# Patient Record
Sex: MALE | Race: WHITE | NOT HISPANIC OR LATINO | Employment: FULL TIME | URBAN - METROPOLITAN AREA
[De-identification: names, ages, dates, MRNs, and addresses within clinical notes are randomized per-mention and may not be internally consistent; named-entity substitution may affect disease eponyms.]

---

## 2017-01-13 ENCOUNTER — NON-PROVIDER VISIT (OUTPATIENT)
Dept: OCCUPATIONAL MEDICINE | Facility: CLINIC | Age: 22
End: 2017-01-13

## 2017-03-29 ENCOUNTER — NON-PROVIDER VISIT (OUTPATIENT)
Dept: URGENT CARE | Facility: PHYSICIAN GROUP | Age: 22
End: 2017-03-29

## 2017-03-29 DIAGNOSIS — Z02.1 PRE-EMPLOYMENT DRUG SCREENING: ICD-10-CM

## 2017-03-29 LAB
AMP AMPHETAMINE: NORMAL
COC COCAINE: NORMAL
INT CON NEG: NEGATIVE
INT CON POS: POSITIVE
MET METHAMPHETAMINES: NORMAL
OPI OPIATES: NORMAL
PCP PHENCYCLIDINE: NORMAL
POC DRUG COMMENT 753798-OCCUPATIONAL HEALTH: POSITIVE
THC: POSITIVE

## 2017-03-29 PROCEDURE — 80305 DRUG TEST PRSMV DIR OPT OBS: CPT | Performed by: NURSE PRACTITIONER

## 2017-03-29 NOTE — PATIENT INSTRUCTIONS
Daniel Jr Ahumada is a 21 y.o. male here for a non-provider visit for uds    If abnormal was an in office provider notified today (if so, indicate provider)? No  Routed to PCP? No

## 2017-04-03 ENCOUNTER — NON-PROVIDER VISIT (OUTPATIENT)
Dept: URGENT CARE | Facility: PHYSICIAN GROUP | Age: 22
End: 2017-04-03

## 2017-04-03 DIAGNOSIS — Z02.1 PRE-EMPLOYMENT DRUG SCREENING: ICD-10-CM

## 2017-04-03 LAB
AMP AMPHETAMINE: NORMAL
COC COCAINE: NORMAL
INT CON NEG: NORMAL
INT CON POS: NORMAL
MET METHAMPHETAMINES: NORMAL
OPI OPIATES: NORMAL
PCP PHENCYCLIDINE: NORMAL
POC DRUG COMMENT 753798-OCCUPATIONAL HEALTH: NEGATIVE
THC: NORMAL

## 2017-04-03 PROCEDURE — 80305 DRUG TEST PRSMV DIR OPT OBS: CPT | Performed by: NURSE PRACTITIONER

## 2017-04-03 NOTE — MR AVS SNAPSHOT
Alfredo Arrington Ahumada   4/3/2017 11:00 AM   Non-Provider Visit   MRN: [id number]    Department:  [facility]   Dept Phone:  [phone]    Description:  Male : 1995   Provider:  [facility]           Reason for Visit     Other PRE EMPLOYMENT UDS      Allergies as of 4/3/2017     Not on File      You were diagnosed with     Pre-employment drug screening   [[id number]]         Basic Information     Date Of Birth Sex Race Ethnicity Preferred Language    1995 Male  or , White Non- English      Your appointments     May 17, 2017  3:00 PM   Initial Psychiatric Eval with Teresita Shaw M.D.   BEHAVIORAL HEALTH [address] [facility] ([facility])    [address] [facility]  [address]  [facility] [address]   [phone]              Health Maintenance     Patient has no pending health maintenance at this time      Results     POCT 6 Panel Urine Drug Screen      Component    AMPHETAMINE    POC THC    COCAINE    OPIATES    PHENCYCLIDINE    METHAMPHETAMINES    POC Urine Drug Screen Comment    NEGATIVE    Internal Control Positive    Valid    Internal Control Negative    Valid                        Current Immunizations     No immunizations on file.      Below and/or attached are the medications your provider expects you to take. Review all of your home medications and newly ordered medications with your provider and/or pharmacist. Follow medication instructions as directed by your provider and/or pharmacist. Please keep your medication list with you and share with your provider. Update the information when medications are discontinued, doses are changed, or new medications (including over-the-counter products) are added; and carry medication information at all times in the event of emergency situations     Allergies:  (Not on file)          Medications  Valid as of: 2017 - 12:36 PM    Generic Name Brand Name Tablet Size Instructions for use    .                 Medicines prescribed today were  sent to:     None      Medication refill instructions:       If your prescription bottle indicates you have medication refills left, it is not necessary to call your provider’s office. Please contact your pharmacy and they will refill your medication.    If your prescription bottle indicates you do not have any refills left, you may request refills at any time through one of the following ways: The online Fablic system (except Urgent Care), by calling your provider’s office, or by asking your pharmacy to contact your provider’s office with a refill request. Medication refills are processed only during regular business hours and may not be available until the next business day. Your provider may request additional information or to have a follow-up visit with you prior to refilling your medication.   *Please Note: Medication refills are assigned a new Rx number when refilled electronically. Your pharmacy may indicate that no refills were authorized even though a new prescription for the same medication is available at the pharmacy. Please request the medicine by name with the pharmacy before contacting your provider for a refill.           Fablic Access Code: [email or web address]  Expires: 5/3/2017 12:36 PM    Your email address is not on file at [facility].  Email Addresses are required for you to sign up for Fablic, please contact [phone] to verify your personal information and to provide your email address prior to attempting to register for Fablic.    Daniel Jr Ahumada  [address] [place] [address]    Fablic  A secure, online tool to manage your health information     [facility]’s Fablic® is a secure, online tool that connects you to your personalized health information from the privacy of your home -- day or night - making it very easy for you to manage your healthcare. Once the activation process is completed, you can even access your medical information using the Fablic aditya, which is available  for free in the Apple Clint store or Google Play store.     To learn more about [employer], visit [email or web address]    There are two levels of access available (as shown below):   My Chart Features  [facility]  Urgent  Care Non-[facility] Primary [facility] Doctor   Email your healthcare team securely and privately 24/7 X X X    Manage appointments: schedule your next appointment; view details of past/upcoming appointments X      Request prescription refills. X      View recent personal medical records, including lab and immunizations X X X X   View health record, including health history, allergies, medications X X X X   Read reports about your outpatient visits, procedures, consult and ER notes X X X X   See your discharge summary, which is a recap of your hospital and/or ER visit that includes your diagnosis, lab results, and care plan X X  X     How to register for InstaJobt:  Once your e-mail address has been verified, follow the following steps to sign up for [employer].     1. Go to  [email or web address]  2. Click on the Sign Up Now box, which takes you to the New Member Sign Up page. You will need to provide the following information:  a. Enter your [employer] Access Code exactly as it appears at the top of this page. (You will not need to use this code after you’ve completed the sign-up process. If you do not sign up before the expiration date, you must request a new code.)   b. Enter your date of birth.   c. Enter your home email address.   d. Click Submit, and follow the next screen’s instructions.  3. Create a InstaJobt ID. This will be your [employer] login ID and cannot be changed, so think of one that is secure and easy to remember.  4. Create a [employer] password. You can change your password at any time.  5. Enter your Password Reset Question and Answer. This can be used at a later time if you forget your password.   6. Enter your e-mail address. This allows you to receive  e-mail notifications when new information is available in [employer].  7. Click Sign Up. You can now view your health information.    For assistance activating your [employer] account, call [phone]

## 2017-05-17 ENCOUNTER — OFFICE VISIT (OUTPATIENT)
Dept: BEHAVIORAL HEALTH | Facility: PHYSICIAN GROUP | Age: 22
End: 2017-05-17
Payer: OTHER MISCELLANEOUS

## 2017-05-17 VITALS
WEIGHT: 168 LBS | SYSTOLIC BLOOD PRESSURE: 134 MMHG | HEART RATE: 66 BPM | BODY MASS INDEX: 27 KG/M2 | DIASTOLIC BLOOD PRESSURE: 74 MMHG | HEIGHT: 66 IN

## 2017-05-17 DIAGNOSIS — F12.20 CANNABIS USE DISORDER, MODERATE, DEPENDENCE (HCC): ICD-10-CM

## 2017-05-17 DIAGNOSIS — F33.2 MDD (MAJOR DEPRESSIVE DISORDER), RECURRENT SEVERE, WITHOUT PSYCHOSIS (HCC): ICD-10-CM

## 2017-05-17 PROCEDURE — 99205 OFFICE O/P NEW HI 60 MIN: CPT | Performed by: PSYCHIATRY & NEUROLOGY

## 2017-05-17 RX ORDER — ALPRAZOLAM 0.25 MG/1
0.25 TABLET ORAL 2 TIMES DAILY PRN
COMMUNITY
Start: 2017-04-06 | End: 2017-05-17 | Stop reason: SDUPTHER

## 2017-05-17 RX ORDER — FLUOXETINE HYDROCHLORIDE 20 MG/1
20 CAPSULE ORAL DAILY
Qty: 7 CAP | Refills: 0 | Status: SHIPPED | OUTPATIENT
Start: 2017-05-17 | End: 2017-05-24 | Stop reason: SDUPTHER

## 2017-05-17 RX ORDER — ALPRAZOLAM 0.25 MG/1
0.25 TABLET ORAL
Qty: 7 TAB | Refills: 0 | Status: SHIPPED
Start: 2017-05-17 | End: 2017-05-24 | Stop reason: SDUPTHER

## 2017-05-17 RX ORDER — PAROXETINE HYDROCHLORIDE 20 MG/1
20 TABLET, FILM COATED ORAL 2 TIMES DAILY
COMMUNITY
End: 2017-05-17

## 2017-05-17 NOTE — PROGRESS NOTES
INITIAL PSYCHIATRY EVALUATION      Chief Complaint   Patient presents with   • Establish Care   • Depression   • Anxiety         History Of Present Illness:  Daniel Jr Ahumada is a 22 y.o. old male with history of depressive disorder, anxiety disorder comes in today to establish care and for evaluation of depression and anxiety. He was here with his older sister. He reports struggling with anxiety and depression since middle school and has been on Paxil for about 4 years. He quit taking Paxil 1.5-2 months ago as he felt it was no longer helping his depression. He was on 20 mg twice daily dosing and felt that it was working until about a few months ago. He started a new job at a local [employer] which he finds extremely stressful and wants to quit but cannot because of financial stressors. He states that he cannot handle stressors well and if he is in a stressful situation he either tries to run away or has suicidal thoughts. He has had on and off suicidal thoughts under stressors. Denies any current self-harm behavior. Denies current active thoughts, intent or plan of wanting to hurt himself. He feels extremely depressed and overwhelmed and hates his life. Does feel hopeless from time to time. He has good support from his parents and 3 sisters and lives with his parents in [place]. He is always been shy and has difficulty making new friends. He does not have a lot of friends and is not an currently in a relationship. He has never been in a long-term relationship and is not happy with his sexuality. He is homosexual but does not like that part about him. His father was not initially supportive but he has come around and all of this family supportive of him at this time. He has struggled a lot with his self-confidence and self-esteem and does not feel like he has any. Endorses anhedonia. Reports struggling with motivation and energy. Her but it has been good. Sleep has been variable, has problems falling asleep and denies  problems to speak. He does not have a lot of hobbies and is not interested in exploring what he might like. Sister feels that he was doing really good about a year ago when he was actively working out with her but then he stopped doing it. He was hospitalized at [facility] in December 2016 for suicidal thoughts but states that no medication changes were made when he was there. Denies any impulsive or reckless behaviors. Denies feeling paranoid or other psychotic symptoms. Denies symptoms consistent with hypomania/joanne. He does endorse anxiety over everything in his life and takes Xanax 0.25 mg which helps him calm down. Denies panic attacks.    Current psychiatric medications - Paxil 20 mg twice daily (stopped taking it 1.5-2 months ago), Xanax 0.25 mg twice daily as needed for anxiety (usually takes once daily)    Past Psychiatric History:  Prior psychiatric hospitalization - One at [facility] in 12/2016 for depression and suicidal thoughts  Self harm/suicide attempt - Denies  Previous medication trials - Zoloft (ineffective), ? Prozac     Past Medical/Surgical History:  Past Medical History   Diagnosis Date   • Depression    • Anxiety      History reviewed. No pertinent past surgical history.    Family Psychiatric History:  Sister - PTSD, depression, anxiety    Substance Use/Addiction History:  Alcohol - Drinks once a week socially with friends  Nicotine - Smokes 1 cigarette every other day  Illicit drugs - Smokes cannabis three times daily    Social History:  Born and raised in [place]. Father is originally from [place] and mom is from [place] [facility]. He has 3 older sisters and has good relationship with all of them. He is currently single, has never been  and has no kids. He graduated high school and has been working at a local [employer]) for about a month. Denies any current legal problems.    Allergies:  Review of patient's allergies indicates no known allergies.    Medications:  Current Outpatient  "Prescriptions   Medication Sig Dispense Refill   • fluoxetine (PROZAC) 20 MG Cap Take 1 Cap by mouth every day. 7 Cap 0   • alprazolam (XANAX) 0.25 MG Tab Take 1 Tab by mouth 1 time daily as needed for Anxiety. 7 Tab 0     No current facility-administered medications for this visit.       Review of Symptoms:  Constitutional - Positive for fatigue  Eyes - Negative for blurry vision  HEENT - Negative for sore throat  Respiratory - Negative for shortness of breath, cough  CVS - Negative for chest pain, palpitations  GI - Negative for nausea, vomiting, abdominal pain, diarrhea, constipation  Skin - Negative for rash  Musculoskeletal - Negative for back pain  Neurological - Negative for headaches  Psychiatric - Positive for depression, anxiety, poor sleep    Physical Examination:  Vital signs: /74 mmHg  Pulse 66  Ht 1.676 m (5' 5.98\")  Wt 76.204 kg (168 lb)  BMI 27.13 kg/m2    Musculoskeletal: Normal gait. No abnormal movements.     Mental Status Evaluation:   General: Young  male, tearful, dressed in casual attire, good grooming and hygiene, in no apparent distress, calm and cooperative, good eye contact, no psychomotor agitation or retardation  Orientation: Alert and oriented to person, place and time  Recent and remote memory: Intact  Attention span and concentration: Intact  Speech: Spontaneous, normal rate, rhythm and tone  Thought Process: Linear, logical and goal directed  Thought Content: Denies suicidal or homicidal ideations, intent or plan  Perception: Denies auditory or visual hallucinations. No delusions noted  Associations: Intact  Language: Appropriate  Fund of knowledge and vocabulary: Adequate  Mood: \"not good\"  Affect: Anxious and dysphoric, mood congruent  Insight: Good  Judgment: Good    Impression:  1. Major depressive disorder, recurrent, severe without psychotic symptoms with anxious distress  2. Cannabis use disorder, mild to moderate    Medical Records/Labs/Diagnostic Tests " Reviewed:  [place] [facility] records - appropriate refills    Plan:  1. Start Prozac 20 mg daily for anxiety and depression.  2. Continue Xanax 0.25 mg daily as needed for anxiety. #7 tabs with no refills faxed to his pharmacy.  3. Endorses passive suicidal thoughts but denies active thoughts, intent or plan to hurt himself. Denies access to guns or medications at home. Provided him with only one week worth supply of medications until his appointment next week. Discussed safety plan with patient and his sister which included calling suicide hit line number @ [phone] ([phone]), calling family, calling ER or going to the closest ER for further evaluation. Sister states that she and her other family members will be keeping close supervision over him until his next follow-up appointment.  4. Refer to Partial Hospitalization Program. He will probably need outpatient individual psychotherapy after he is done with the intensive program to work on his coping skills and self-esteem in addition to anxiety and depression.  5. Encouraged him to cut back on his cannabis use as it is contributing to his lack of motivation and desire.    Return to clinic in 1 week or sooner if symptoms worsen    The proposed treatment plan was discussed with the patient who was provided the opportunity to ask questions and make suggestions regarding alternative treatment. Patient verbalized understanding and expressed agreement with the plan.     Total face-to-face time: 60 minutes  More than 50% of face-to-face time was spent in counseling and coordinating care. Discussed depression, management, cannabis addiction, safety plan.     Teresita Shaw M.D.  05/17/2017    This note was created using voice recognition software (Dragon). The accuracy of the dictation is limited by the abilities of the software. I have reviewed the note prior to signing, however some errors in grammar and context are still possible. If you have any questions related to this  note please do not hesitate to contact our office.

## 2017-05-24 ENCOUNTER — OFFICE VISIT (OUTPATIENT)
Dept: BEHAVIORAL HEALTH | Facility: PHYSICIAN GROUP | Age: 22
End: 2017-05-24
Payer: OTHER MISCELLANEOUS

## 2017-05-24 VITALS
DIASTOLIC BLOOD PRESSURE: 70 MMHG | HEIGHT: 66 IN | BODY MASS INDEX: 26.52 KG/M2 | SYSTOLIC BLOOD PRESSURE: 120 MMHG | WEIGHT: 165 LBS | HEART RATE: 78 BPM

## 2017-05-24 DIAGNOSIS — F33.1 MDD (MAJOR DEPRESSIVE DISORDER), RECURRENT EPISODE, MODERATE (HCC): ICD-10-CM

## 2017-05-24 DIAGNOSIS — F12.20 CANNABIS USE DISORDER, MODERATE, DEPENDENCE (HCC): ICD-10-CM

## 2017-05-24 PROBLEM — F33.2 MDD (MAJOR DEPRESSIVE DISORDER), RECURRENT SEVERE, WITHOUT PSYCHOSIS (HCC): Status: RESOLVED | Noted: 2017-05-17 | Resolved: 2017-05-24

## 2017-05-24 PROCEDURE — 99213 OFFICE O/P EST LOW 20 MIN: CPT | Performed by: PSYCHIATRY & NEUROLOGY

## 2017-05-24 RX ORDER — FLUOXETINE HYDROCHLORIDE 20 MG/1
20 CAPSULE ORAL DAILY
Qty: 30 CAP | Refills: 0 | Status: SHIPPED | OUTPATIENT
Start: 2017-05-24 | End: 2017-06-23 | Stop reason: SDUPTHER

## 2017-05-24 RX ORDER — ALPRAZOLAM 0.25 MG/1
0.25 TABLET ORAL
Qty: 15 TAB | Refills: 0 | Status: SHIPPED
Start: 2017-05-24

## 2017-05-24 NOTE — PROGRESS NOTES
PSYCHIATRY FOLLOW-UP NOTE      Chief Complaint   Patient presents with   • Follow-Up     depression, anxiety         History Of Present Illness:  Daniel Jr Ahumada is a 22 y.o. old male with major depressive disorder, anxiety disorder comes in today for follow up, was last seen for an initial evaluation 1 week ago. He was here with his sister. His affect was improved from his initial appointment. He reports doing better in the last one week. He quit his job this Saturday and is feeling a lot less stressed because of that. He has been compliant with Prozac and has not noticed any side effects. Given his sister and other family members have noticed an improvement in his mood in the last one week. He is afraid that he does not handle stressful situations in a very good way and is trying to look for a job that might be less stressful for him. 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 Denies having passive or active thoughts of wanting to hurt himself in the last few days. Denies any self-harm behavior. He has been going to the gym every day since he quit his job and that has been helping his mood and anxiety as well. He is sleeping and eating better. His mom is back home from [facility] and he has good support from her as well. He does feel overwhelmed because of the financial stressors that he has because of no job but plans on working on it. He still endorses lack of motivation and lack of energy. He has also cut back on his cannabis use and is not using it several times a day. Denies any other new stressful situation at this time.    Social History:   He is currently single, has never been  and has no kids. He has good support from his parents and 3 older sisters. He lives with his parents and Richard. He graduated high school and is currently unemployed. Denies any current legal problems.    Substance Use:  Alcohol - Drinks once a week socially with friends  Nicotine -  "Smokes 1 cigarette every other day  Illicit drugs - Smokes cannabis daily, has cut down use from three times daily to once daily    Past Medication Trials:  Zoloft (ineffective), Paxil (initially effective)    Medications:  Current Outpatient Prescriptions   Medication Sig Dispense Refill   • fluoxetine (PROZAC) 20 MG Cap Take 1 Cap by mouth every day. 30 Cap 0   • alprazolam (XANAX) 0.25 MG Tab Take 1 Tab by mouth 1 time daily as needed for Anxiety. 7 Tab 0     No current facility-administered medications for this visit.       Review Of Systems:    Constitutional - Positive for fatigue  Respiratory - Negative for shortness of breath, cough  CVS - Negative for chest pain, palpitations  GI - Negative for nausea, vomiting, abdominal pain, diarrhea, constipation  Musculoskeletal - Negative for back pain  Neurological - Negative for headaches  Psychiatric - Positive for depression, anxiety    Physical Examination:  Vital signs: /70 mmHg  Pulse 78  Ht 1.676 m (5' 6\")  Wt 74.844 kg (165 lb)  BMI 26.64 kg/m2    Musculoskeletal: Normal gait. No abnormal movements.     Mental Status Evaluation:   General: Young  male, dressed in casual attire, good grooming and hygiene, in no apparent distress, calm and cooperative, good eye contact, no psychomotor agitation or retardation  Orientation: Alert and oriented to person, place and time  Recent and remote memory: Grossly intact  Attention span and concentration: Grossly intact  Speech: Spontaneous, normal rate, rhythm and tone  Thought Process: Linear, logical and goal directed  Thought Content: Denies suicidal or homicidal ideations, intent or plan  Perception: Denies auditory or visual hallucinations. No delusions noted  Associations: Intact  Language: Appropriate  Fund of knowledge and vocabulary: Grossly adequate  Mood: \"better\"  Affect: Dysphoric at times, mood congruent  Insight: Good  Judgment: Good      Impression:  1. Major depressive disorder, " recurrent, moderate with anxious distress  2. Cannabis use disorder, mild to moderate    Medical Records/Labs/Diagnostic Tests Reviewed:  [place]  records - appropriate refills     Plan:  1. Continue Prozac 20 mg daily for depression and anxiety  2. Continue Xanax 0.25 mg daily as needed for anxiety. #15 tabs with no refills faxed to [facility].  3. Reminded him about his appointment on 5/31/17 with Kanchan Pedroza R.N. for intensive outpatient program  4. Encouraged to continue to cut back on his cannabis use    Return to clinic in 4 weeks or sooner if symptoms worsen    The proposed treatment plan was discussed with the patient who was provided the opportunity to ask questions and make suggestions regarding alternative treatment. Patient verbalized understanding and expressed agreement with the plan.     Teresita Shaw M.D.  05/24/2017    This note was created using voice recognition software (Dragon). The accuracy of the dictation is limited by the abilities of the software. I have reviewed the note prior to signing, however some errors in grammar and context are still possible. If you have any questions related to this note please do not hesitate to contact our office.

## 2017-05-31 ENCOUNTER — HOSPITAL ENCOUNTER (OUTPATIENT)
Dept: BEHAVIORAL HEALTH | Facility: MEDICAL CENTER | Age: 22
End: 2017-05-31
Attending: PSYCHIATRY & NEUROLOGY
Payer: OTHER MISCELLANEOUS

## 2017-05-31 DIAGNOSIS — F33.1 MDD (MAJOR DEPRESSIVE DISORDER), RECURRENT EPISODE, MODERATE (HCC): ICD-10-CM

## 2017-05-31 DIAGNOSIS — F12.20 CANNABIS USE DISORDER, MODERATE, DEPENDENCE (HCC): ICD-10-CM

## 2017-06-07 ENCOUNTER — TELEPHONE (OUTPATIENT)
Dept: BEHAVIORAL HEALTH | Facility: MEDICAL CENTER | Age: 22
End: 2017-06-07

## 2017-06-07 NOTE — TELEPHONE ENCOUNTER
NCNS to program on 6/5/17.  This writer called Alfredo yesterday and spoke to him.  Reports he forgot to come yesterday.  Confirmed with him at the time that he would be here today on 6/7 for a 1:1 at 8am as well as for program.  NCNS for both today.  This writer called and left him a message to please call this writer related to interest in program.

## 2017-06-23 RX ORDER — FLUOXETINE HYDROCHLORIDE 20 MG/1
CAPSULE ORAL
Qty: 30 CAP | Refills: 0 | Status: SHIPPED | OUTPATIENT
Start: 2017-06-23 | End: 2017-08-10 | Stop reason: SDUPTHER

## 2017-07-17 RX ORDER — FLUOXETINE HYDROCHLORIDE 20 MG/1
CAPSULE ORAL
Qty: 30 CAP | Refills: 0 | Status: SHIPPED | OUTPATIENT
Start: 2017-07-17 | End: 2017-08-11

## 2017-08-11 RX ORDER — FLUOXETINE HYDROCHLORIDE 20 MG/1
CAPSULE ORAL
Qty: 30 CAP | Refills: 0 | Status: SHIPPED | OUTPATIENT
Start: 2017-08-11

## 2018-01-01 ENCOUNTER — APPOINTMENT (OUTPATIENT)
Dept: RADIOLOGY | Facility: MEDICAL CENTER | Age: 23
End: 2018-01-01
Attending: EMERGENCY MEDICINE

## 2018-01-01 ENCOUNTER — HOSPITAL ENCOUNTER (EMERGENCY)
Facility: MEDICAL CENTER | Age: 23
End: 2018-01-01
Attending: EMERGENCY MEDICINE

## 2018-01-01 VITALS
TEMPERATURE: 97.7 F | SYSTOLIC BLOOD PRESSURE: 129 MMHG | HEART RATE: 89 BPM | OXYGEN SATURATION: 97 % | DIASTOLIC BLOOD PRESSURE: 74 MMHG | HEIGHT: 66 IN | RESPIRATION RATE: 18 BRPM | BODY MASS INDEX: 20.89 KG/M2 | WEIGHT: 130 LBS

## 2018-01-01 DIAGNOSIS — Z13.9 ENCOUNTER FOR MEDICAL SCREENING EXAMINATION: ICD-10-CM

## 2018-01-01 DIAGNOSIS — F10.920 ALCOHOLIC INTOXICATION WITHOUT COMPLICATION (HCC): ICD-10-CM

## 2018-01-01 DIAGNOSIS — F43.21 SITUATIONAL DEPRESSION: ICD-10-CM

## 2018-01-01 LAB — GLUCOSE BLD-MCNC: 100 MG/DL (ref 65–99)

## 2018-01-01 PROCEDURE — 307740 HCHG GREEN TRAUMA TEAM SERVICES

## 2018-01-01 PROCEDURE — 99285 EMERGENCY DEPT VISIT HI MDM: CPT

## 2018-01-01 PROCEDURE — 71045 X-RAY EXAM CHEST 1 VIEW: CPT

## 2018-01-01 PROCEDURE — 82962 GLUCOSE BLOOD TEST: CPT

## 2018-01-01 ASSESSMENT — PAIN SCALES - GENERAL
PAINLEVEL_OUTOF10: 0
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
## 2018-01-01 NOTE — ED NOTES
Report from Walter SELF. Assumed care of pt. Pt resting quietly in room, A&O x4. Pt CAMERON, denies pain. Denies LOC. Pt ambulatory to BR with steady gait. FSBG 100 mg/dl.

## 2018-01-01 NOTE — ED NOTES
Discharge instructions provided, pt verbalizes understanding. Pt awake, alert, VSS. Pt ambulatory with steady gait out of ER.

## 2018-04-06 ENCOUNTER — HOSPITAL ENCOUNTER (EMERGENCY)
Facility: MEDICAL CENTER | Age: 23
End: 2018-04-06
Attending: EMERGENCY MEDICINE

## 2018-04-06 ENCOUNTER — APPOINTMENT (OUTPATIENT)
Dept: RADIOLOGY | Facility: MEDICAL CENTER | Age: 23
End: 2018-04-06
Attending: EMERGENCY MEDICINE

## 2018-04-06 VITALS
HEIGHT: 64 IN | BODY MASS INDEX: 24.62 KG/M2 | SYSTOLIC BLOOD PRESSURE: 118 MMHG | HEART RATE: 70 BPM | WEIGHT: 144.18 LBS | RESPIRATION RATE: 16 BRPM | OXYGEN SATURATION: 100 % | TEMPERATURE: 98.1 F | DIASTOLIC BLOOD PRESSURE: 76 MMHG

## 2018-04-06 DIAGNOSIS — R53.1 WEAKNESS: ICD-10-CM

## 2018-04-06 DIAGNOSIS — M54.50 ACUTE BILATERAL LOW BACK PAIN WITHOUT SCIATICA: ICD-10-CM

## 2018-04-06 LAB
ALBUMIN SERPL BCP-MCNC: 4.4 G/DL (ref 3.2–4.9)
ALBUMIN/GLOB SERPL: 1.7 G/DL
ALP SERPL-CCNC: 96 U/L (ref 30–99)
ALT SERPL-CCNC: 15 U/L (ref 2–50)
ANION GAP SERPL CALC-SCNC: 8 MMOL/L (ref 0–11.9)
APPEARANCE UR: CLEAR
AST SERPL-CCNC: 21 U/L (ref 12–45)
BASOPHILS # BLD AUTO: 0.4 % (ref 0–1.8)
BASOPHILS # BLD: 0.04 K/UL (ref 0–0.12)
BILIRUB SERPL-MCNC: 1.2 MG/DL (ref 0.1–1.5)
BUN SERPL-MCNC: 16 MG/DL (ref 8–22)
CALCIUM SERPL-MCNC: 9.1 MG/DL (ref 8.5–10.5)
CHLORIDE SERPL-SCNC: 106 MMOL/L (ref 96–112)
CO2 SERPL-SCNC: 26 MMOL/L (ref 20–33)
COLOR UR AUTO: YELLOW
CREAT SERPL-MCNC: 1.1 MG/DL (ref 0.5–1.4)
EKG IMPRESSION: NORMAL
EOSINOPHIL # BLD AUTO: 0.05 K/UL (ref 0–0.51)
EOSINOPHIL NFR BLD: 0.5 % (ref 0–6.9)
ERYTHROCYTE [DISTWIDTH] IN BLOOD BY AUTOMATED COUNT: 42.5 FL (ref 35.9–50)
EST. AVERAGE GLUCOSE BLD GHB EST-MCNC: 108 MG/DL
GLOBULIN SER CALC-MCNC: 2.6 G/DL (ref 1.9–3.5)
GLUCOSE BLD-MCNC: 88 MG/DL (ref 65–99)
GLUCOSE SERPL-MCNC: 94 MG/DL (ref 65–99)
GLUCOSE UR QL STRIP.AUTO: NEGATIVE MG/DL
HBA1C MFR BLD: 5.4 % (ref 0–5.6)
HCT VFR BLD AUTO: 43.4 % (ref 42–52)
HGB BLD-MCNC: 14.8 G/DL (ref 14–18)
IMM GRANULOCYTES # BLD AUTO: 0.04 K/UL (ref 0–0.11)
IMM GRANULOCYTES NFR BLD AUTO: 0.4 % (ref 0–0.9)
KETONES UR QL STRIP.AUTO: NEGATIVE MG/DL
LEUKOCYTE ESTERASE UR QL STRIP.AUTO: NEGATIVE
LIPASE SERPL-CCNC: 14 U/L (ref 11–82)
LYMPHOCYTES # BLD AUTO: 1.35 K/UL (ref 1–4.8)
LYMPHOCYTES NFR BLD: 12.4 % (ref 22–41)
MAGNESIUM SERPL-MCNC: 2.5 MG/DL (ref 1.5–2.5)
MCH RBC QN AUTO: 32 PG (ref 27–33)
MCHC RBC AUTO-ENTMCNC: 34.1 G/DL (ref 33.7–35.3)
MCV RBC AUTO: 93.9 FL (ref 81.4–97.8)
MONOCYTES # BLD AUTO: 1.26 K/UL (ref 0–0.85)
MONOCYTES NFR BLD AUTO: 11.6 % (ref 0–13.4)
NEUTROPHILS # BLD AUTO: 8.15 K/UL (ref 1.82–7.42)
NEUTROPHILS NFR BLD: 74.7 % (ref 44–72)
NITRITE UR QL STRIP.AUTO: NEGATIVE
NRBC # BLD AUTO: 0 K/UL
NRBC BLD-RTO: 0 /100 WBC
PH UR STRIP.AUTO: 7 [PH]
PHOSPHATE SERPL-MCNC: 2.8 MG/DL (ref 2.5–4.5)
PLATELET # BLD AUTO: 221 K/UL (ref 164–446)
PMV BLD AUTO: 11.2 FL (ref 9–12.9)
POTASSIUM SERPL-SCNC: 3.7 MMOL/L (ref 3.6–5.5)
PROT SERPL-MCNC: 7 G/DL (ref 6–8.2)
PROT UR QL STRIP: 30 MG/DL
RBC # BLD AUTO: 4.62 M/UL (ref 4.7–6.1)
RBC UR QL AUTO: ABNORMAL
SODIUM SERPL-SCNC: 140 MMOL/L (ref 135–145)
SP GR UR: <=1.005
TSH SERPL DL<=0.005 MIU/L-ACNC: 0.87 UIU/ML (ref 0.38–5.33)
WBC # BLD AUTO: 10.9 K/UL (ref 4.8–10.8)

## 2018-04-06 PROCEDURE — 74176 CT ABD & PELVIS W/O CONTRAST: CPT

## 2018-04-06 PROCEDURE — 83735 ASSAY OF MAGNESIUM: CPT

## 2018-04-06 PROCEDURE — 99284 EMERGENCY DEPT VISIT MOD MDM: CPT

## 2018-04-06 PROCEDURE — 96360 HYDRATION IV INFUSION INIT: CPT

## 2018-04-06 PROCEDURE — 80053 COMPREHEN METABOLIC PANEL: CPT

## 2018-04-06 PROCEDURE — 83690 ASSAY OF LIPASE: CPT

## 2018-04-06 PROCEDURE — 84443 ASSAY THYROID STIM HORMONE: CPT

## 2018-04-06 PROCEDURE — 93005 ELECTROCARDIOGRAM TRACING: CPT | Performed by: EMERGENCY MEDICINE

## 2018-04-06 PROCEDURE — 82962 GLUCOSE BLOOD TEST: CPT

## 2018-04-06 PROCEDURE — 93005 ELECTROCARDIOGRAM TRACING: CPT

## 2018-04-06 PROCEDURE — 85025 COMPLETE CBC W/AUTO DIFF WBC: CPT

## 2018-04-06 PROCEDURE — 81002 URINALYSIS NONAUTO W/O SCOPE: CPT

## 2018-04-06 PROCEDURE — 700105 HCHG RX REV CODE 258: Performed by: EMERGENCY MEDICINE

## 2018-04-06 PROCEDURE — 83036 HEMOGLOBIN GLYCOSYLATED A1C: CPT

## 2018-04-06 PROCEDURE — 84100 ASSAY OF PHOSPHORUS: CPT

## 2018-04-06 RX ORDER — SODIUM CHLORIDE 9 MG/ML
1000 INJECTION, SOLUTION INTRAVENOUS ONCE
Status: COMPLETED | OUTPATIENT
Start: 2018-04-06 | End: 2018-04-06

## 2018-04-06 RX ADMIN — SODIUM CHLORIDE 1000 ML: 9 INJECTION, SOLUTION INTRAVENOUS at 09:40

## 2018-04-06 ASSESSMENT — PAIN SCALES - GENERAL: PAINLEVEL_OUTOF10: 8

## 2018-04-06 NOTE — DISCHARGE INSTRUCTIONS
Back Pain, Adult  Back pain is very common in adults. The cause of back pain is rarely dangerous and the pain often gets better over time. The cause of your back pain may not be known. Some common causes of back pain include:  · Strain of the muscles or ligaments supporting the spine.  · Wear and tear (degeneration) of the spinal disks.  · Arthritis.  · Direct injury to the back.  For many people, back pain may return. Since back pain is rarely dangerous, most people can learn to manage this condition on their own.  Follow these instructions at home:  Watch your back pain for any changes. The following actions may help to lessen any discomfort you are feeling:  · Remain active. It is stressful on your back to sit or  one place for long periods of time. Do not sit, drive, or  one place for more than 30 minutes at a time. Take short walks on even surfaces as soon as you are able. Try to increase the length of time you walk each day.  · Exercise regularly as directed by your health care provider. Exercise helps your back heal faster. It also helps avoid future injury by keeping your muscles strong and flexible.  · Do not stay in bed. Resting more than 1-2 days can delay your recovery.  · Pay attention to your body when you bend and lift. The most comfortable positions are those that put less stress on your recovering back. Always use proper lifting techniques, including:  ¨ Bending your knees.  ¨ Keeping the load close to your body.  ¨ Avoiding twisting.  · Find a comfortable position to sleep. Use a firm mattress and lie on your side with your knees slightly bent. If you lie on your back, put a pillow under your knees.  · Avoid feeling anxious or stressed. Stress increases muscle tension and can worsen back pain. It is important to recognize when you are anxious or stressed and learn ways to manage it, such as with exercise.  · Take medicines only as directed by your health care provider.  Over-the-counter medicines to reduce pain and inflammation are often the most helpful. Your health care provider may prescribe muscle relaxant drugs. These medicines help dull your pain so you can more quickly return to your normal activities and healthy exercise.  · Apply ice to the injured area:  ¨ Put ice in a plastic bag.  ¨ Place a towel between your skin and the bag.  ¨ Leave the ice on for 20 minutes, 2-3 times a day for the first 2-3 days. After that, ice and heat may be alternated to reduce pain and spasms.  · Maintain a healthy weight. Excess weight puts extra stress on your back and makes it difficult to maintain good posture.  Contact a health care provider if:  · You have pain that is not relieved with rest or medicine.  · You have increasing pain going down into the legs or buttocks.  · You have pain that does not improve in one week.  · You have night pain.  · You lose weight.  · You have a fever or chills.  Get help right away if:  · You develop new bowel or bladder control problems.  · You have unusual weakness or numbness in your arms or legs.  · You develop nausea or vomiting.  · You develop abdominal pain.  · You feel faint.  This information is not intended to replace advice given to you by your health care provider. Make sure you discuss any questions you have with your health care provider.  Document Released: 12/18/2006 Document Revised: 04/27/2017 Document Reviewed: 04/21/2015  [employer] Interactive Patient Education © 2017 [employer]      Weakness  Weakness is a lack of strength. It may be felt all over the body (generalized) or in one specific part of the body (focal). Some causes of weakness can be serious. You may need further medical evaluation, especially if you are elderly or you have a history of immunosuppression (such as chemotherapy or HIV), kidney disease, heart disease, or diabetes.  CAUSES   Weakness can be caused by many different things, including:  · Infection.  · Physical  exhaustion.  · Internal bleeding or other blood loss that results in a lack of red blood cells (anemia).  · Dehydration. This cause is more common in elderly people.  · Side effects or electrolyte abnormalities from medicines, such as pain medicines or sedatives.  · Emotional distress, anxiety, or depression.  · Circulation problems, especially severe peripheral arterial disease.  · Heart disease, such as rapid atrial fibrillation, bradycardia, or heart failure.  · Nervous system disorders, such as Guillain-Barré syndrome, multiple sclerosis, or stroke.  DIAGNOSIS   To find the cause of your weakness, your caregiver will take your history and perform a physical exam. Lab tests or X-rays may also be ordered, if needed.  TREATMENT   Treatment of weakness depends on the cause of your symptoms and can vary greatly.  HOME CARE INSTRUCTIONS   · Rest as needed.  · Eat a well-balanced diet.  · Try to get some exercise every day.  · Only take over-the-counter or prescription medicines as directed by your caregiver.  SEEK MEDICAL CARE IF:   · Your weakness seems to be getting worse or spreads to other parts of your body.  · You develop new aches or pains.  SEEK IMMEDIATE MEDICAL CARE IF:   · You cannot perform your normal daily activities, such as getting dressed and feeding yourself.  · You cannot walk up and down stairs, or you feel exhausted when you do so.  · You have shortness of breath or chest pain.  · You have difficulty moving parts of your body.  · You have weakness in only one area of the body or on only one side of the body.  · You have a fever.  · You have trouble speaking or swallowing.  · You cannot control your bladder or bowel movements.  · You have black or bloody vomit or stools.  MAKE SURE YOU:  · Understand these instructions.  · Will watch your condition.  · Will get help right away if you are not doing well or get worse.  This information is not intended to replace advice given to you by your health  care provider. Make sure you discuss any questions you have with your health care provider.  Document Released: 12/18/2006 Document Revised: 06/18/2013 Document Reviewed: 10/07/2016  Elsevier Interactive Patient Education © 2017 [employer]

## 2018-04-06 NOTE — ED TRIAGE NOTES
C/o Quintanilla since last Saturday, low back pain, burning w urination,  polydipsia and polyuria, dry mouth, , had a near syncopal episode in shower, FSBS 88 at triage, EKG done at triage, given UA sample supplies, denies nvd, cp, sob.

## 2018-04-06 NOTE — ED PROVIDER NOTES
"ED Provider Note    Scribed for Pietro Rosado D.O. by Mihaela Fisher. 4/6/2018  9:27 AM    Primary care provider: Kobe No M.D.  Means of arrival: Walk-in  History obtained from: Patient  History limited by: None    CHIEF COMPLAINT  Chief Complaint   Patient presents with   • Painful Urination   • Low Back Pain   • Near Syncopal   • Headache       HPI  Daniel Jr Ahumada is a 22 y.o. male who presents to the Emergency Department for evaluation after a near syncopal event just prior to arrival. The patient was taking a shower this morning when suddenly his body collapsed and went weak. He describes his weakness as a \"tingling sensation\". Upon arrival to the ED he reports his legs \"fell asleep\". The patient reports associated lower back pain bilaterally, dehydration, chills, dysuria, nausea, and increased urine output. He denies diarrhea, abdominal pain, fever, emesis, urinary incontinence, and loss of sensation between his legs.     The patient confirms his mother has a history of diabetes. He states he uses recreational marijuana but denies any IV drug usage. Patient denies history of back pain.      REVIEW OF SYSTEMS  Pertinent positives include near syncope, weakness, lower back pain bilaterally, dehydration, chills, dysuria, nausea, increased urine output. Pertinent negatives include no diarrhea, abdominal pain, fever, emesis, urinary incontinence, loss of sensation between his legs..  All other systems reviewed and negative. C.    PAST MEDICAL HISTORY  Past Medical History:   Diagnosis Date   • Anxiety    • Depression        SURGICAL HISTORY  No past surgical history on file.     SOCIAL HISTORY  Social History   Substance Use Topics   • Smoking status: Never Smoker   • Smokeless tobacco: Never Used   • Alcohol use Yes      Comment: daily      History   Drug Use   • Types: Marijuana, Inhaled     Comment: smokes cannabis daily       FAMILY HISTORY  Family History   Problem Relation Age of Onset   • " "Hypertension Mother    • Diabetes Mother    • Anxiety disorder Mother    • Depression Sister    • Anxiety disorder Sister    • Other Sister      PTSD       CURRENT MEDICATIONS  Home Medications    **Home medications have not yet been reviewed for this encounter**         ALLERGIES  No Known Allergies    PHYSICAL EXAM  VITAL SIGNS: /72   Pulse 67   Temp 36.7 °C (98.1 °F)   Resp 16   Ht 1.626 m (5' 4\")   Wt 65.4 kg (144 lb 2.9 oz)   SpO2 100%   BMI 24.75 kg/m²     Nursing notes and vitals reviewed.  Constitutional: Well developed, Well nourished, No acute distress, Non-toxic appearance.   HENT: Dry mucous membranes.  Eyes: PERRLA, EOMI, Conjunctiva normal, No discharge.   Cardiovascular: Normal heart rate, Normal rhythm, No murmurs, No rubs, No gallops.   Thorax & Lungs: No respiratory distress, No rales, No rhonchi, No wheezing, No chest tenderness.   Abdomen: Bowel sounds normal, Soft, No tenderness, No guarding, No rebound, No masses, No pulsatile masses.   Skin: Warm, Dry, No erythema, No rash.   Musculoskeletal: Intact distal pulses, No edema, No cyanosis, No clubbing. Good range of motion in all major joints. No tenderness to palpation or major deformities noted, no CVA tenderness, no midline back tenderness. No saddle anesthesia. Perfect vertebral lumbar. No paravertebral lumbar muscle spasm and tenderness. No step-off deformity.  Neurologic: Alert & oriented to month and age, Normal cognition, Cranial nerves II-XII are intact, No slurred speech, Negative finger to nose bilaterally, No pronator drift bilaterally,   strength 5/5 bilaterally, Leg raise strength 5/5 bilaterally, Plantarflexion strength 5/5 bilaterally, Dorsiflexion strength 5/5 bilaterally, Deep tendon reflexes 2/4 upper and lower extremities bilaterally, Sensation intact throughout, No Nystagmus. No saddle anesthesia.  Psychiatric: Affect normal for clinical presentation.      DIAGNOSTIC STUDIES/PROCEDURES    LABS  Results for " orders placed or performed during the hospital encounter of 04/06/18   CBC WITH DIFFERENTIAL   Result Value Ref Range    WBC 10.9 (H) 4.8 - 10.8 K/uL    RBC 4.62 (L) 4.70 - 6.10 M/uL    Hemoglobin 14.8 14.0 - 18.0 g/dL    Hematocrit 43.4 42.0 - 52.0 %    MCV 93.9 81.4 - 97.8 fL    MCH 32.0 27.0 - 33.0 pg    MCHC 34.1 33.7 - 35.3 g/dL    RDW 42.5 35.9 - 50.0 fL    Platelet Count 221 164 - 446 K/uL    MPV 11.2 9.0 - 12.9 fL    Neutrophils-Polys 74.70 (H) 44.00 - 72.00 %    Lymphocytes 12.40 (L) 22.00 - 41.00 %    Monocytes 11.60 0.00 - 13.40 %    Eosinophils 0.50 0.00 - 6.90 %    Basophils 0.40 0.00 - 1.80 %    Immature Granulocytes 0.40 0.00 - 0.90 %    Nucleated RBC 0.00 /100 WBC    Neutrophils (Absolute) 8.15 (H) 1.82 - 7.42 K/uL    Lymphs (Absolute) 1.35 1.00 - 4.80 K/uL    Monos (Absolute) 1.26 (H) 0.00 - 0.85 K/uL    Eos (Absolute) 0.05 0.00 - 0.51 K/uL    Baso (Absolute) 0.04 0.00 - 0.12 K/uL    Immature Granulocytes (abs) 0.04 0.00 - 0.11 K/uL    NRBC (Absolute) 0.00 K/uL   COMP METABOLIC PANEL   Result Value Ref Range    Sodium 140 135 - 145 mmol/L    Potassium 3.7 3.6 - 5.5 mmol/L    Chloride 106 96 - 112 mmol/L    Co2 26 20 - 33 mmol/L    Anion Gap 8.0 0.0 - 11.9    Glucose 94 65 - 99 mg/dL    Bun 16 8 - 22 mg/dL    Creatinine 1.10 0.50 - 1.40 mg/dL    Calcium 9.1 8.5 - 10.5 mg/dL    AST(SGOT) 21 12 - 45 U/L    ALT(SGPT) 15 2 - 50 U/L    Alkaline Phosphatase 96 30 - 99 U/L    Total Bilirubin 1.2 0.1 - 1.5 mg/dL    Albumin 4.4 3.2 - 4.9 g/dL    Total Protein 7.0 6.0 - 8.2 g/dL    Globulin 2.6 1.9 - 3.5 g/dL    A-G Ratio 1.7 g/dL   LIPASE   Result Value Ref Range    Lipase 14 11 - 82 U/L   HEMOGLOBIN A1C   Result Value Ref Range    Glycohemoglobin 5.4 0.0 - 5.6 %    Est Avg Glucose 108 mg/dL   TSH   Result Value Ref Range    TSH 0.870 0.380 - 5.330 uIU/mL   MAGNESIUM   Result Value Ref Range    Magnesium 2.5 1.5 - 2.5 mg/dL   PHOSPHORUS   Result Value Ref Range    Phosphorus 2.8 2.5 - 4.5 mg/dL   ESTIMATED  GFR   Result Value Ref Range    GFR If African American >60 >60 mL/min/1.73 m 2    GFR If Non African American >60 >60 mL/min/1.73 m 2   ACCU-CHEK GLUCOSE   Result Value Ref Range    Glucose - Accu-Ck 88 65 - 99 mg/dL   POC UA   Result Value Ref Range    POC Color Yellow     POC Appearance Clear     POC Glucose Negative Negative mg/dL    POC Ketones Negative Negative mg/dL    POC Specific Gravity <=1.005 (A) 1.005 - 1.030    POC Blood Moderate (A) Negative    POC Urine PH 7.0 5.0 - 8.0    POC Protein 30 (A) Negative mg/dL    POC Nitrites Negative Negative    POC Leukocyte Esterase Negative Negative   EKG (ER)   Result Value Ref Range    Report       [facility] Emergency Dept.    Test Date:  2018  Pt Name:    DANIEL AHUMADA               Department: ER  MRN:        [id number]                      Room:  Gender:     Male                         Technician: [id number]  :        1995                   Requested By:ER TRIAGE PROTOCOL  Order #:    [id number]                    Reading MD:    Measurements  Intervals                                Axis  Rate:       55                           P:          72  DC:         160                          QRS:        49  QRSD:       84                           T:          46  QT:         412  QTc:        394    Interpretive Statements  SINUS BRADYCARDIA  No previous ECG available for comparison         All labs reviewed by me.      RADIOLOGY  CT-RENAL COLIC EVALUATION(A/P W/O)   Final Result         1.  Negative noncontrast CT scan of the abdomen and pelvis.      2.  No hydronephrosis or nephrolithiasis.      3.  Normal appearance of the appendix in the right lower quadrant.        The radiologist's interpretation of all radiological studies have been reviewed by me.    COURSE & MEDICAL DECISION MAKING  Pertinent Labs & Imaging studies reviewed. (See chart for details)    9:27 AM - Patient seen and examined at bedside. Patient will be treated with NS  "Infusion 1,000 mL secondary to his dry mucous membranes. Ordered Hemoglobin A1C, TSH, Magnesium, Phosphorus, Estimated GFR, CBC with Differential, CMP, Lipase, POC urinalysis, POC Ua, Accu-Chek Glucose, EKG to evaluate his symptoms.     12:35 PM Recheck: Patient is resting comfortably and reports feeling improved. She does not have any other complaints at this time. I updated her on her results, which indicated no acute abnormalities. I explained that she is now stable for discharge. I advised her to follow up with her primary care provider and to return to the ED for new or worsening symptoms. She understands and will comply.   /72   Pulse 67   Temp 36.7 °C (98.1 °F)   Resp 16   Ht 1.626 m (5' 4\")   Wt 65.4 kg (144 lb 2.9 oz)   SpO2 100%   BMI 24.75 kg/m²       This is a charming 22 y.o. male that presents with vague complaints of thirst, increased urination, near syncope. Here in the emergency department, CBC is negative and I believe he has a significant infection is afebrile, white lights are negative does not have evidence of diabetes, a significant lites abnormality. TSH is normal as well. Reevaluation after receiving IV fluids, patient is asymptomatic. I'm not sure the etiology of patient's condition but he does not have evidence of CVA, TIA A, myocardial infarction, diabetes, severe electrolyte abnormality. 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 He has no evidence of cauda equina syndrome, no red flags for epidural abscess, epidural hematoma, he is ambulatory is no focal neurological deficits.    The patient will return for new or worsening symptoms and is stable at the time of discharge.      DISPOSITION:  Patient will be discharged home in stable condition.    FOLLOW UP:  [facility], Emergency Dept  [address] [address] [phone]    If symptoms " sandrita No M.D.  [address]  [address]  [place] [address]  [phone]    Schedule an appointment as soon as possible for a visit in 3 days        OUTPATIENT MEDICATIONS:  Discharge Medication List as of 4/6/2018 12:42 PM          FINAL IMPRESSION  1. Weakness    2. Acute bilateral low back pain without sciatica          I, Mihaela Fisher (Scribe), am scribing for, and in the presence of, Pietro Rosado D.O    Electronically signed by: Mihaela Fisher (Scribe), 4/6/2018    IPietro D.O. personally performed the services described in this documentation, as scribed by Mihaela Fisher in my presence, and it is both accurate and complete.    The note accurately reflects work and decisions made by me.  Pietro Rosado  4/6/2018  3:53 PM

## 2018-04-08 ENCOUNTER — HOSPITAL ENCOUNTER (EMERGENCY)
Facility: MEDICAL CENTER | Age: 23
End: 2018-04-08
Attending: EMERGENCY MEDICINE

## 2018-04-08 VITALS
BODY MASS INDEX: 24.94 KG/M2 | TEMPERATURE: 97.9 F | HEART RATE: 51 BPM | HEIGHT: 65 IN | SYSTOLIC BLOOD PRESSURE: 129 MMHG | WEIGHT: 149.69 LBS | OXYGEN SATURATION: 96 % | RESPIRATION RATE: 18 BRPM | DIASTOLIC BLOOD PRESSURE: 68 MMHG

## 2018-04-08 DIAGNOSIS — R19.7 NAUSEA VOMITING AND DIARRHEA: ICD-10-CM

## 2018-04-08 DIAGNOSIS — R11.2 NAUSEA VOMITING AND DIARRHEA: ICD-10-CM

## 2018-04-08 DIAGNOSIS — S33.5XXD LOW BACK SPRAIN, SUBSEQUENT ENCOUNTER: ICD-10-CM

## 2018-04-08 LAB
ALBUMIN SERPL BCP-MCNC: 4.2 G/DL (ref 3.2–4.9)
ALBUMIN/GLOB SERPL: 1.7 G/DL
ALP SERPL-CCNC: 85 U/L (ref 30–99)
ALT SERPL-CCNC: 13 U/L (ref 2–50)
ANION GAP SERPL CALC-SCNC: 11 MMOL/L (ref 0–11.9)
APPEARANCE UR: CLEAR
AST SERPL-CCNC: 17 U/L (ref 12–45)
BASOPHILS # BLD AUTO: 0.3 % (ref 0–1.8)
BASOPHILS # BLD: 0.04 K/UL (ref 0–0.12)
BILIRUB SERPL-MCNC: 0.5 MG/DL (ref 0.1–1.5)
BILIRUB UR QL STRIP.AUTO: NEGATIVE
BUN SERPL-MCNC: 13 MG/DL (ref 8–22)
CALCIUM SERPL-MCNC: 8.8 MG/DL (ref 8.5–10.5)
CHLORIDE SERPL-SCNC: 109 MMOL/L (ref 96–112)
CO2 SERPL-SCNC: 25 MMOL/L (ref 20–33)
COLOR UR: YELLOW
CREAT SERPL-MCNC: 1.19 MG/DL (ref 0.5–1.4)
CULTURE IF INDICATED INDCX: NO UA CULTURE
EOSINOPHIL # BLD AUTO: 0.05 K/UL (ref 0–0.51)
EOSINOPHIL NFR BLD: 0.4 % (ref 0–6.9)
ERYTHROCYTE [DISTWIDTH] IN BLOOD BY AUTOMATED COUNT: 42.5 FL (ref 35.9–50)
GLOBULIN SER CALC-MCNC: 2.5 G/DL (ref 1.9–3.5)
GLUCOSE SERPL-MCNC: 114 MG/DL (ref 65–99)
GLUCOSE UR STRIP.AUTO-MCNC: NEGATIVE MG/DL
HCT VFR BLD AUTO: 41.3 % (ref 42–52)
HGB BLD-MCNC: 14.2 G/DL (ref 14–18)
IMM GRANULOCYTES # BLD AUTO: 0.05 K/UL (ref 0–0.11)
IMM GRANULOCYTES NFR BLD AUTO: 0.4 % (ref 0–0.9)
KETONES UR STRIP.AUTO-MCNC: NEGATIVE MG/DL
LEUKOCYTE ESTERASE UR QL STRIP.AUTO: NEGATIVE
LYMPHOCYTES # BLD AUTO: 1.41 K/UL (ref 1–4.8)
LYMPHOCYTES NFR BLD: 12.3 % (ref 22–41)
MCH RBC QN AUTO: 32.3 PG (ref 27–33)
MCHC RBC AUTO-ENTMCNC: 34.4 G/DL (ref 33.7–35.3)
MCV RBC AUTO: 94.1 FL (ref 81.4–97.8)
MICRO URNS: NORMAL
MONOCYTES # BLD AUTO: 0.91 K/UL (ref 0–0.85)
MONOCYTES NFR BLD AUTO: 8 % (ref 0–13.4)
NEUTROPHILS # BLD AUTO: 8.98 K/UL (ref 1.82–7.42)
NEUTROPHILS NFR BLD: 78.6 % (ref 44–72)
NITRITE UR QL STRIP.AUTO: NEGATIVE
NRBC # BLD AUTO: 0 K/UL
NRBC BLD-RTO: 0 /100 WBC
PH UR STRIP.AUTO: 7.5 [PH]
PLATELET # BLD AUTO: 212 K/UL (ref 164–446)
PMV BLD AUTO: 11.6 FL (ref 9–12.9)
POTASSIUM SERPL-SCNC: 3.8 MMOL/L (ref 3.6–5.5)
PROT SERPL-MCNC: 6.7 G/DL (ref 6–8.2)
PROT UR QL STRIP: NEGATIVE MG/DL
RBC # BLD AUTO: 4.39 M/UL (ref 4.7–6.1)
RBC UR QL AUTO: NEGATIVE
SODIUM SERPL-SCNC: 145 MMOL/L (ref 135–145)
SP GR UR STRIP.AUTO: 1
UROBILINOGEN UR STRIP.AUTO-MCNC: 0.2 MG/DL
WBC # BLD AUTO: 11.4 K/UL (ref 4.8–10.8)

## 2018-04-08 PROCEDURE — 36415 COLL VENOUS BLD VENIPUNCTURE: CPT

## 2018-04-08 PROCEDURE — 700105 HCHG RX REV CODE 258: Performed by: EMERGENCY MEDICINE

## 2018-04-08 PROCEDURE — 700111 HCHG RX REV CODE 636 W/ 250 OVERRIDE (IP): Performed by: EMERGENCY MEDICINE

## 2018-04-08 PROCEDURE — 96375 TX/PRO/DX INJ NEW DRUG ADDON: CPT

## 2018-04-08 PROCEDURE — 80053 COMPREHEN METABOLIC PANEL: CPT

## 2018-04-08 PROCEDURE — 99284 EMERGENCY DEPT VISIT MOD MDM: CPT

## 2018-04-08 PROCEDURE — 96374 THER/PROPH/DIAG INJ IV PUSH: CPT

## 2018-04-08 PROCEDURE — 85025 COMPLETE CBC W/AUTO DIFF WBC: CPT

## 2018-04-08 PROCEDURE — 96361 HYDRATE IV INFUSION ADD-ON: CPT

## 2018-04-08 PROCEDURE — 81003 URINALYSIS AUTO W/O SCOPE: CPT

## 2018-04-08 RX ORDER — SODIUM CHLORIDE 9 MG/ML
1000 INJECTION, SOLUTION INTRAVENOUS ONCE
Status: COMPLETED | OUTPATIENT
Start: 2018-04-08 | End: 2018-04-08

## 2018-04-08 RX ORDER — METOCLOPRAMIDE HYDROCHLORIDE 5 MG/ML
10 INJECTION INTRAMUSCULAR; INTRAVENOUS ONCE
Status: COMPLETED | OUTPATIENT
Start: 2018-04-08 | End: 2018-04-08

## 2018-04-08 RX ORDER — ONDANSETRON 2 MG/ML
8 INJECTION INTRAMUSCULAR; INTRAVENOUS ONCE
Status: COMPLETED | OUTPATIENT
Start: 2018-04-08 | End: 2018-04-08

## 2018-04-08 RX ORDER — ONDANSETRON 4 MG/1
4 TABLET, ORALLY DISINTEGRATING ORAL EVERY 6 HOURS PRN
Qty: 10 TAB | Refills: 0 | Status: SHIPPED | OUTPATIENT
Start: 2018-04-08

## 2018-04-08 RX ADMIN — METOCLOPRAMIDE 10 MG: 5 INJECTION, SOLUTION INTRAMUSCULAR; INTRAVENOUS at 06:09

## 2018-04-08 RX ADMIN — ONDANSETRON 8 MG: 2 INJECTION, SOLUTION INTRAMUSCULAR; INTRAVENOUS at 04:49

## 2018-04-08 RX ADMIN — SODIUM CHLORIDE 1000 ML: 9 INJECTION, SOLUTION INTRAVENOUS at 04:50

## 2018-04-08 ASSESSMENT — LIFESTYLE VARIABLES: DO YOU DRINK ALCOHOL: NO

## 2018-04-08 NOTE — ED NOTES
Pt discharged to home, prescriptions for Zofran in hand.  Pt is a/ox4, amb with a steady gait, all belongings accounted for.  Pt educated to return to ed if symptoms worsen

## 2018-04-08 NOTE — ED NOTES
Pt medicated for nausea, NS infusing.  Blood drawn off IV start and sent to lab.  Pt to provide urine sample when able. Pt resting comfortably on gurney, family at bedside

## 2018-04-08 NOTE — DISCHARGE INSTRUCTIONS
Viral Gastroenteritis, Adult  Viral gastroenteritis is also known as the stomach flu. This condition is caused by various viruses. These viruses can be passed from person to person very easily (are very contagious). This condition may affect your stomach, small intestine, and large intestine. It can cause sudden watery diarrhea, fever, and vomiting.  Diarrhea and vomiting can make you feel weak and cause you to become dehydrated. You may not be able to keep fluids down. Dehydration can make you tired and thirsty, cause you to have a dry mouth, and decrease how often you urinate. Older adults and people with other diseases or a weak immune system are at higher risk for dehydration.  It is important to replace the fluids that you lose from diarrhea and vomiting. If you become severely dehydrated, you may need to get fluids through an IV tube.  What are the causes?  Gastroenteritis is caused by various viruses, including rotavirus and norovirus. Norovirus is the most common cause in adults.  You can get sick by eating food, drinking water, or touching a surface contaminated with one of these viruses. You can also get sick from sharing utensils or other personal items with an infected person.  What increases the risk?  This condition is more likely to develop in people:  · Who have a weak defense system (immune system).  · Who live with one or more children who are younger than 2 years old.  · Who live in a nursing home.  · Who go on cruise ships.  What are the signs or symptoms?  Symptoms of this condition start suddenly 1-2 days after exposure to a virus. Symptoms may last a few days or as long as a week. The most common symptoms are watery diarrhea and vomiting. Other symptoms include:  · Fever.  · Headache.  · Fatigue.  · Pain in the abdomen.  · Chills.  · Weakness.  · Nausea.  · Muscle aches.  · Loss of appetite.  How is this diagnosed?  This condition is diagnosed with a medical history and physical exam. You may  also have a stool test to check for viruses or other infections.  How is this treated?  This condition typically goes away on its own. The focus of treatment is to restore lost fluids (rehydration). Your health care provider may recommend that you take an oral rehydration solution (ORS) to replace important salts and minerals (electrolytes) in your body. Severe cases of this condition may require giving fluids through an IV tube.  Treatment may also include medicine to help with your symptoms.  Follow these instructions at home:  Follow instructions from your health care provider about how to care for yourself at home.  Eating and drinking  Follow these recommendations as told by your health care provider:  · Take an ORS. This is a drink that is sold at pharmacies and retail stores.  · Drink clear fluids in small amounts as you are able. Clear fluids include water, ice chips, diluted fruit juice, and low-calorie sports drinks.  · Eat bland, easy-to-digest foods in small amounts as you are able. These foods include bananas, applesauce, rice, lean meats, toast, and crackers.  · Avoid fluids that contain a lot of sugar or caffeine, such as energy drinks, sports drinks, and soda.  · Avoid alcohol.  · Avoid spicy or fatty foods.  General instructions  · Drink enough fluid to keep your urine clear or pale yellow.  · Wash your hands often. If soap and water are not available, use hand .  · Make sure that all people in your household wash their hands well and often.  · Take over-the-counter and prescription medicines only as told by your health care provider.  · Rest at home while you recover.  · Watch your condition for any changes.  · Take a warm bath to relieve any burning or pain from frequent diarrhea episodes.  · Keep all follow-up visits as told by your health care provider. This is important.  Contact a health care provider if:  · You cannot keep fluids down.  · Your symptoms get worse.  · You have new  symptoms.  · You feel light-headed or dizzy.  · You have muscle cramps.  Get help right away if:  · You have chest pain.  · You feel extremely weak or you faint.  · You see blood in your vomit.  · Your vomit looks like coffee grounds.  · You have bloody or black stools or stools that look like tar.  · You have a severe headache, a stiff neck, or both.  · You have a rash.  · You have severe pain, cramping, or bloating in your abdomen.  · You have trouble breathing or you are breathing very quickly.  · Your heart is beating very quickly.  · Your skin feels cold and clammy.  · You feel confused.  · You have pain when you urinate.  · You have signs of dehydration, such as:  ¨ Dark urine, very little urine, or no urine.  ¨ Cracked lips.  ¨ Dry mouth.  ¨ Sunken eyes.  ¨ Sleepiness.  ¨ Weakness.  This information is not intended to replace advice given to you by your health care provider. Make sure you discuss any questions you have with your health care provider.  Document Released: 12/18/2006 Document Revised: 05/31/2017 Document Reviewed: 08/23/2016  [employer] Patient Education © 2017 [employer]

## 2018-04-08 NOTE — ED TRIAGE NOTES
.Daniel Jr Ahumada  .22 y.o.  .  Chief Complaint   Patient presents with   • Low Back Pain     X1 week; seen for same on 4/6 and dc'd w/ diagnosis of back pain; rated at a 7/10; described as a nonradiating dull achy pain; worsens w/ ambulation   • Dehydration     pt states he has a dry mouth and is dehydrated from vomiting and diarrhea.      Pt ambulatory to triage w/ several complaints including what's listed above; pt seen for same on 4/6 and dc'd w/ diagnosis of back pain w/out sciatica. NAD observed in triage.   Pt placed in lobby and advised to notify staff of new or worsening symptoms.

## 2018-04-08 NOTE — ED PROVIDER NOTES
ED Provider Note    Scribed for Brittani Dan M.D. by Yarely Hobbs. 4/8/2018, 4:32 AM.    Primary care provider: Kobe No M.D.  Means of arrival: Walk in  History obtained from: Patient  History limited by: None    CHIEF COMPLAINT  Chief Complaint   Patient presents with   • Low Back Pain     X1 week; seen for same on 4/6 and dc'd w/ diagnosis of back pain; rated at a 7/10; described as a nonradiating dull achy pain; worsens w/ ambulation   • Dehydration     pt states he has a dry mouth and is dehydrated from vomiting and diarrhea.        HPI  Daniel Jr Ahumada is a 22 y.o. male who presents to the Emergency Department for bilateral lower back pain onset 1 week ago. He states the pain is a 7/10. It is exacerbate by walking. The patient additionally endorses chills, vomiting, and diarrhea. He also reports dehydration with associated dry mouth. The patient was seen in the ED 4/6 for the same complaint and diagnosed with back pain. He has been taking Ibuprofen 600 mg with mild relief of his pain. Patient denies hematochezia, hematuria, fevers, or weakness in legs. He reports no recent travel.     REVIEW OF SYSTEMS  Positives as above. Pertinent negatives include  hematochezia, hematuria, fevers, or weakness in legs   All other review of systems are negative.  C.    PAST MEDICAL HISTORY   has a past medical history of Anxiety and Depression.    SURGICAL HISTORY  patient denies any surgical history    SOCIAL HISTORY  Social History   Substance Use Topics   • Smoking status: Never Smoker   • Smokeless tobacco: Never Used   • Alcohol use Yes      Comment: daily      History   Drug Use   • Types: Marijuana, Inhaled     Comment: smokes cannabis daily       FAMILY HISTORY  Family History   Problem Relation Age of Onset   • Hypertension Mother    • Diabetes Mother    • Anxiety disorder Mother    • Depression Sister    • Anxiety disorder Sister    • Other Sister      PTSD       CURRENT MEDICATIONS  Reviewed. See  "Encounter Summary.     ALLERGIES  No Known Allergies    PHYSICAL EXAM  VITAL SIGNS: /68   Pulse 66   Temp 36.6 °C (97.9 °F) (Temporal)   Resp 18   Ht 1.651 m (5' 5\")   Wt 67.9 kg (149 lb 11.1 oz)   SpO2 98%   BMI 24.91 kg/m²    Pulse ox interpretation: I interpret this pulse ox as normal.  Constitutional: Alert in no apparent distress.  HENT: Normocephalic atraumatic, dry mucous membranes.   Eyes: PERR, Conjunctiva normal, Non-icteric.   Neck: Normal range of motion, No tenderness, Supple, No stridor.   Lymphatic: No lymphadenopathy noted.   Cardiovascular: Regular rate and rhythm, no murmurs.   Thorax & Lungs: Normal breath sounds, No respiratory distress, No wheezing, No chest tenderness.   Abdomen: Hyperactive bowel sounds, Soft, No tenderness, No pulsatile masses. No peritoneal signs.  Skin: Warm, Dry, No erythema, No rash.   Back: No bony tenderness, No CVA tenderness.   Extremities: Intact distal pulses, No edema, No tenderness, No cyanosis  Musculoskeletal: Bilateral paraspinal musculature sacroiliac tenderness. No midline tenderness, step offs, swelling, or redness.  Neurologic: Alert and oriented, No focal deficits noted. Strength 5/5 bilateral upper and lower extremities, ambulated without difficulty . Sensation in tact to light touch distally. No clonus.    DIFFERENTIAL DIAGNOSIS AND WORK UP PLAN    4:32 AM Patient seen and examined at bedside. The patient presents with bilateral lower back pain and the differential diagnosis includes but is not limited to viral gastroenteritis, electrolyte abnormality, UTI, dehydration, musculature sprain or strain. Low concern for central spinous process, epidural abscess, or discitis. Ordered for Urinalysis, CMP, and CBC to evaluate. Patient will be treated with Zofran 8 mg for his symptoms. He will additionally receive 1 L NS for dry mucous membranes.    DIAGNOSTIC STUDIES / PROCEDURES     LABS  CBC with a mildly elevated white blood cell count 11 mildly " "increased from 10 few days ago with a left shift, CMP within normal limits urinalysis normal  All labs were reviewed by me.    COURSE & MEDICAL DECISION MAKING  Pertinent Labs & Imaging studies reviewed. (See chart for details)    5:56 AM - Reviewed patient's lab results as shown above.    5:57 AM - Patient reevaluated at bedside. He reports to be experiencing some mild nausea, but is able to tolerate Po's. The patient has had positive reaction to IV fluids.    6:17 AM - Patient reevaluated at bedside. He was informed of lab results as shown above. Patient will be discharged. He is agreeable.     This is a 22 y.o. year old male who presents with likely to diagnoses a viral gastroenteritis with nausea vomiting diarrhea, feeling better after 2 doses of antiemetics and tolerating ice chips. He'll be sent home with ODT Zofran and strict return precautions for any worsening vomiting that is not controlled at home. Likely has a lower back muscular strain or sprain we discussed range of motion exercises icing and stretching. He continue to take ibuprofen. He'll follow up with his primary care provider    /74   Pulse (!) 51   Temp 36.6 °C (97.9 °F) (Temporal)   Resp 18   Ht 1.651 m (5' 5\")   Wt 67.9 kg (149 lb 11.1 oz)   SpO2 96%   BMI 24.91 kg/m²       The patient will return for new or worsening symptoms and is stable at the time of discharge.    DISPOSITION:  Patient will be discharged home in stable condition.    FOLLOW UP:  Kobe No M.D.  [address]  [address]  [place] [facility] [address]  [phone]    Schedule an appointment as soon as possible for a visit      [facility], Emergency Dept  [address] [address] [phone]    If symptoms worsen      OUTPATIENT MEDICATIONS:  New Prescriptions    ONDANSETRON (ZOFRAN ODT) 4 MG TABLET DISPERSIBLE    Take 1 Tab by mouth every 6 hours as needed for Nausea.       FINAL IMPRESSION  1. Nausea vomiting and diarrhea    2. Low " back sprain, subsequent encounter          I, Yarely Hobbs (Scribe), am scribing for, and in the presence of, Brittani Dan M.D..    Electronically signed by: Yarely Hobbs (Laureenibrenny), 4/8/2018    IBrittani M.D. personally performed the services described in this documentation, as scribed by Yarely Hobbs in my presence, and it is both accurate and complete.    The note accurately reflects work and decisions made by me.  Brittani Dan  4/8/2018  6:26 AM    This dictation has been created using voice recognition software and/or scribes. The accuracy of the dictation is limited by the abilities of the software and the expertise of the scribes. I expect there may be some errors of grammar and possibly content. I made every attempt to manually correct the errors within my dictation. However, errors related to voice recognition software and/or scribes may still exist and should be interpreted within the appropriate context.

## 2023-09-01 NOTE — BH THERAPY
[facility]  INITIAL ASSESSMENT    Name: Daniel Jr Ahumada  MRN: [id number]  : 1995  Age: 22 y.o.  Date of assessment: 2017  PCP: Pcp Pt States None  Persons in attendance: Patient  Total session time: 60 minutes      CHIEF COMPLAINT AND HISTORY OF PRESENTING PROBLEM: 22 year male seeking treatment for anxiety and depression.  Reports having mood swings and feeling agitated.    (as stated by Patient):  Daniel Jr Ahumada is a 22 y.o.,  or   White male referred for assessment by No ref. provider found.  Primary presenting issue includes   Chief Complaint   Patient presents with   • Depression   • Anxiety   .    FAMILY/SOCIAL HISTORY  Current living situation/household members: Lives with his parents and cousin and his daughter who is 18.    Relevant family history/structure/dynamics: youngest of 6, reports he gets along well with his family.    Current family/social stressors: looking for a job.    Quality/quantity of current family and/or social support: has friends who all they do is smoke pot.   Does patient/parent report a family history of behavioral health issues, diagnoses, or treatment? Yes  Family History   Problem Relation Age of Onset   • Hypertension Mother    • Diabetes Mother    • Anxiety disorder Mother    • Depression Sister    • Anxiety disorder Sister    • Other Sister      PTSD        BEHAVIORAL HEALTH TREATMENT HISTORY  Does patient/parent report a history of prior behavioral health treatment for patient? Yes:    Dates Level of Care Facilty/Provider Diagnosis/Problem Medications   2017 OP Dr Shaw Dep/anx prozac    OP Job humza dep paxil                                                                 History of untreated behavioral health issues identified? Yes    MEDICAL HISTORY  Primary care behavioral health screenings: @PHQ@   Past medical/surgical history:   Past Medical History   Diagnosis Date   • Depression    • Anxiety       History reviewed. No  pertinent past surgical history.     Medication Allergies:  Review of patient's allergies indicates no known allergies.     Patient reports last physical exam: unknown  Does patient/parent report any history of or current developmental concerns? No  Does patient/parent report nutritional concerns? No  Does patient/parent report change in appetite or weight loss/gain? No   Does patient/parent report history of eating disorder symptoms? No  Does patient/parent report dental problem? No  Does patient/parent report physical pain? Yes   Indicate if pain is acute or chronic, and location: Left foot pain x 4 weeks, unsure what it is from. Encouraged to have it checked out. 6/10   Pain scale rating: [unfilled]   Does patient/parent report functional impact of medical, developmental, or pain issues?   no    EDUCATIONAL  Is patient currently enrolled in a school/educational program?   No:   Highest grade level completed: HS  School performance/functioning: fair  History of Special Education/repeated grades/learning issues: yes - dyslexia  Preferred learning style: doing  Current learning needs (large print, language barrier, etc):  na    EMPLOYMENT/RESOURCES  Is the patient currently employed? No  Does the patient/parent report adequate financial resources? No  Does patient identify impact of presenting issue on work functioning? Yes  Work or income-related stressors:  Looking for a job.     HISTORY:  Does patient report current or past enlistment? No    [If yes, trigger below section]  Does patient report history of exposure to combat? No  Does patient report history of  sexual trauma? No  Does patient report other -related stressors? No    SPIRITUAL/CULTURAL/IDENTITY:  What are the patient’s/family’s spiritual beliefs or practices? agostic  What is the patient’s cultural or ethnic background/identity? caucasion  How does the patient identify their sexual orientation? homosexual  How does the patient  identify their gender? male  Does the patient identify any spiritual/cultural/identity factors as relevant to the presenting issue? No    LEGAL HISTORY  Has the patient ever been involved with juvenile, adult, or family legal systems? No   [If yes, trigger section below:]  Does patient report ever being a victim of a crime?  No  Does patient report involvement in any current legal issues?  No  Does patient report ever being arrested or committing a crime? No  Does patient report any current agency (parole/probation/CPS/) involvement? No    ABUSE/NEGLECT/TRAUMA SCREENING  Does patient report feeling “unsafe” in his/her home, or afraid of anyone? No  Does patient report any history of physical, sexual, or emotional abuse? No  Does parent or significant other report any of the above? No  Is there evidence of neglect by self? No  Is there evidence of neglect by a caregiver? No  Does the patient/parent report any history of CPS/APS/police involvement related to suspected abuse/neglect or domestic violence? No  Does the patient/parent report any other history of potentially traumatic life events? No  Based on the information provided during the current assessment, is a mandated report of suspected abuse/neglect being made?  No     SAFETY ASSESSMENT - SELF  Does patient acknowledge current or past symptoms of dangerousness to self? Yes  History of S/I a few years ago.  Denies S/I at this time.   Does parent/significant other report patient has current or past symptoms of dangerousness to self? Yes:     Past Current    Suicidal Thoughts: []  []    Suicidal Plans: []  []    Suicidal Intent: []  []    Suicide Attempts: []  []    Self-Injury []  []      For any boxes checked above, provide detail: past suicidal thoughts only, no attempts.    History of suicide by family member: no  History of suicide by friend/significant other: no  Recent change in frequency/specificity/intensity of suicidal thoughts or  self-harm behavior? no  Current access to firearms, medications, or other identified means of suicide/self-harm? no  If yes, willing to restrict access to means of suicide/self-harm? yes - no access.  Protective factors present:  Fear of suicide, Future-oriented, Good impulse control and Hopefulness      Recent change in frequency/specificity/intensity of suicidal thoughts or self-harm behavior? No  Current access to firearms, medications, or other identified means of suicide/self-harm? No  If yes, willing to restrict access to means of suicide/self-harm? na  Protective factors present: Fear of suicide, Future-oriented, Good impulse control and Hopefulness    Current Suicide Risk: Low  Crisis Safety Plan completed and copy given to patient: Yes    SAFETY ASSESSMENT - OTHERS  Does paor past symptoms of aggressive behavior or risk to others? No  Does parent/significant othtient acknowledge current or past symptoms of aggressive behavior or risk to others? No  Does parent/significant other report patient has current or past symptoms of aggressive behavior or risk to others? No    Recent change in frequency/specificity/intensity of thoughts or threats to harm others? No  Current access to firearms/other identified means of harm? No  If yes, willing to restrict access to weapons/means of harm? na  Protective factors present: Good frustration tolerance, Fear of consequences, Well-developed sense of empathy and Positive impulse-control    Current Homicide Risk:  Not applicable  Crisis Safety Plan completed and copy given to patient? No  Based on information provided during the current assessment, is a mandated “duty to warn” being exercised? No    SUBSTANCE USE/ADDICTION HISTORY  [] Not applicable - patient 10 years of age or younger    Is there a family history of substance use/addiction? Yes  Does patient acknowledge or parent/significant other report use of/dependence on substances? Yes  Last time patient used alcohol:  "5/30/2017  Within the past week? Yes  Last time patient used marijuana: 5/30/17  Within the past month? Yes  Any other street drugs ever tried even once? No  Any use of prescription medications/pills without a prescription, or for reasons others than originally prescribed?  No  Any other addictive behavior reported (gambling, shopping, sex)? No     Drug History:  Amphetamine:  Amphetamine frequency: Never used      Cannibis:  Cannabis last use: 5/30/17  Cannabis method: Smoke      Cocaine:  Cocaine frequency: Never used      Ecstasy:  Ecstasy frequency: Never used      Hallucinogen:  Hallucinogen frequency: Never used      Inhalant:   Inhalant frequency: Never used      Opiate:  Opiate frequency: Never used  Cannabis last use: 5/30/17      Other:  Other drug frequency: Never used      Sedative:   Sedative frequency: Never used          What consequences does the patient associate with any of the above substance use and or addictive behaviors? None    Patient’s motivation/readiness for change: \"I just want to get better\"    [] Patient denies use of any substance/addictive behaviors    STRENGTHS/ASSETS  Strengths Identified by interviewer: Insight into problems, Evidence of good judgement, Family suppport, Social support, Stable relationships and Optimism  Strengths Identified by patient: hard worker, good brother. Really nice to everybody\"    MENTAL STATUS/OBSERVATIONS   Participation: Active verbal participation, Attentive, Engaged and Open to feedback  Grooming: Casual and Neat  Orientation:Alert and Fully Oriented   Behavior: Calm and Tense  Eye contact: Good   Mood:Anxious  Affect:Flexible and Full range  Thought process: Logical and Goal-directed  Thought content:  Paranoia   Feels like people are talking about him all the time.  Speech: Rate within normal limits  Perception: Within normal limits  Memory: Recent:  Good  Insight: Adequate  Judgment:  Adequate  Other:    Family/couple interaction observations: " na    RESULTS OF SCREENING MEASURES:  [x] Not applicable  Measure:   Score:     Measure:   Score:       CLINICAL FORMULATION: 22 yr old male seeking treatment for anxiety and depression.  He also admits to having a problem stopping smoking cannabis.  Agrees to come into [facility] to treat his depression and cannabis.  Agrees to stop smoking cannabis and go to a MA meeting.  Reports he is taking his medication as prescribed and feels he is getting better.  He has passive S/I at times but denies S/I at this time.  Agrees to call crisis line or talk to family if having thoughts again.        DIAGNOSTIC IMPRESSION(S):  No diagnosis found.      IDENTIFIED NEEDS/PLAN:  [If any of these marked, trigger DISPOSITION list]  Mood/anxiety and Substance use/Addictive behavior  Refer to [facility]: Intensive Outpatient Program    Does patient express agreement with the above plan? Yes     Referral appointment(s) scheduled? Yes     End time of session: 1440    Kanchan Pedroza R.N.       Yes

## 2025-03-27 ENCOUNTER — RESULTS FOLLOW-UP (OUTPATIENT)
Dept: URGENT CARE | Facility: PHYSICIAN GROUP | Age: 30
End: 2025-03-27

## 2025-03-27 ENCOUNTER — OFFICE VISIT (OUTPATIENT)
Dept: URGENT CARE | Facility: PHYSICIAN GROUP | Age: 30
End: 2025-03-27
Payer: COMMERCIAL

## 2025-03-27 ENCOUNTER — HOSPITAL ENCOUNTER (OUTPATIENT)
Dept: RADIOLOGY | Facility: MEDICAL CENTER | Age: 30
End: 2025-03-27
Attending: NURSE PRACTITIONER
Payer: COMMERCIAL

## 2025-03-27 VITALS
WEIGHT: 136.7 LBS | TEMPERATURE: 97.4 F | HEART RATE: 60 BPM | OXYGEN SATURATION: 96 % | DIASTOLIC BLOOD PRESSURE: 84 MMHG | BODY MASS INDEX: 21.97 KG/M2 | RESPIRATION RATE: 17 BRPM | SYSTOLIC BLOOD PRESSURE: 112 MMHG | HEIGHT: 66 IN

## 2025-03-27 DIAGNOSIS — R06.02 SOB (SHORTNESS OF BREATH): ICD-10-CM

## 2025-03-27 DIAGNOSIS — R42 DIZZINESS: ICD-10-CM

## 2025-03-27 DIAGNOSIS — F41.1 GENERALIZED ANXIETY DISORDER: ICD-10-CM

## 2025-03-27 PROCEDURE — 3074F SYST BP LT 130 MM HG: CPT | Performed by: NURSE PRACTITIONER

## 2025-03-27 PROCEDURE — 3079F DIAST BP 80-89 MM HG: CPT | Performed by: NURSE PRACTITIONER

## 2025-03-27 PROCEDURE — 93000 ELECTROCARDIOGRAM COMPLETE: CPT | Performed by: NURSE PRACTITIONER

## 2025-03-27 PROCEDURE — 99214 OFFICE O/P EST MOD 30 MIN: CPT | Performed by: NURSE PRACTITIONER

## 2025-03-27 PROCEDURE — 71046 X-RAY EXAM CHEST 2 VIEWS: CPT

## 2025-03-27 RX ORDER — BUPROPION HYDROCHLORIDE 150 MG/1
TABLET ORAL
COMMUNITY
Start: 2025-02-04

## 2025-03-27 RX ORDER — HYDROXYZINE HYDROCHLORIDE 10 MG/1
TABLET, FILM COATED ORAL
COMMUNITY
Start: 2025-01-29

## 2025-03-27 NOTE — PROGRESS NOTES
"Patient/parent/guardian has consented to treatment and for use of patient information for treatment and billing purposes.  Subjective:   Daniel Jr Ahumada  is a 29 y.o. male who presents for Tingling (Face numbness, body tingling, dizziness, can't breath, started last week after taking medication ( Fluoxetine, Hydroxyzine, and bupropion). )       HPI  29-year-old male presents with concern for episode of facial tingling, body tingling, dizziness feeling short of breath and as if you are going to pass out.  Symptoms began this afternoon while at work.  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  He has a long history of anxiety and has been taking fluoxetine, bupropion, and hydroxyzine for the least 6 months.  He has had and increased in these episodes.  Denies any recent illness, fever, chills, chest pain, nausea, vomiting or diarrhea.  Smokes marijuana daily, but has not smoked today.  No family history of cardiovascular disease or sudden cardiac death.  He is followed by a psychiatrist at \[facility]\""s clinic.    ROS      CURRENT MEDICATIONS:  ALPRAZolam Tabs  buPROPion  FLUoxetine Caps  hydrOXYzine HCl Tabs  ondansetron Tbdp  Allergies:   No Known Allergies  Current Problems: Daniel Jr Ahumada does not have any pertinent problems on file.  Past Surgical Hx:  No past surgical history on file.   Past Social Hx:  reports that he has never smoked. He has never used smokeless tobacco. He reports that he does not currently use alcohol. He reports current drug use. Drugs: Marijuana and Inhaled.    Objective:   /84   Pulse 60   Temp 36.3 °C (97.4 °F)   Resp 17   Ht 1.676 m (5' 6\")   Wt 62 kg (136 lb 11.2 oz)   SpO2 96%   BMI 22.06 kg/m²   Physical Exam  Vitals and nursing note reviewed.   Constitutional:       General: He is not in acute distress.     Appearance: Normal appearance. He is normal weight. He is not ill-appearing.   HENT:      Head: Normocephalic and atraumatic.      Right Ear: External ear " normal. A middle ear effusion is present.      Left Ear: External ear normal. A middle ear effusion is present.      Nose: Nose normal.      Mouth/Throat:      Mouth: Mucous membranes are moist.   Eyes:      Extraocular Movements: Extraocular movements intact.      Conjunctiva/sclera: Conjunctivae normal.      Pupils: Pupils are equal, round, and reactive to light.   Cardiovascular:      Rate and Rhythm: Normal rate and regular rhythm.      Pulses: Normal pulses.      Heart sounds: Normal heart sounds, S1 normal and S2 normal.   Pulmonary:      Effort: Pulmonary effort is normal.      Breath sounds: Normal breath sounds. No decreased breath sounds, wheezing, rhonchi or rales.   Abdominal:      Palpations: Abdomen is soft.   Musculoskeletal:         General: Normal range of motion.      Cervical back: Full passive range of motion without pain, normal range of motion and neck supple.   Skin:     General: Skin is warm and dry.      Coloration: Skin is not pale.      Findings: No rash.   Neurological:      General: No focal deficit present.      Mental Status: He is alert and oriented to person, place, and time.   Psychiatric:         Mood and Affect: Mood normal.         Behavior: Behavior normal.     DX-CHEST-2 VIEWS  Result Date: 3/27/2025  3/27/2025 3:41 PM HISTORY/REASON FOR EXAM:  Shortness of Breath TECHNIQUE/EXAM DESCRIPTION AND NUMBER OF VIEWS: Two views of the chest. COMPARISON:  None. FINDINGS: No pulmonary infiltrates or consolidations are noted. No pleural effusions, no pneumothorax are appreciated. Normal cardiopericardial silhouette.     1. No active cardiopulmonary abnormalities are identified.      Assessment/Plan:   1. Dizziness  - EKG  - DX-CHEST-2 VIEWS    2. SOB (shortness of breath)  - DX-CHEST-2 VIEWS    3. Generalized anxiety disorder    Other orders  - buPROPion (WELLBUTRIN XL) 150 MG XL tablet; TAKE 1 TABLET BY MOUTH EVERY DAY STARTDATE : 01-  - hydrOXYzine HCl (ATARAX) 10 MG Tab; 1  TABLET AS NEEDED ORALLY TWICE A DAY AS NEEDED ANXIETY AND/OR INSOMNIA 30 DAYS  29-year-old male brought in by mother for evaluation of dizziness and shortness of breath.  Vital signs stable, afebrile.  He is in no acute distress, does not appear ill.  His exam is reassuring, negative other than slight middle ear effusion bilaterally.  Discussed differential diagnosis with patient and mother including new onset viral illness, generalized anxiety with panic attack, and less likely ACS, PE, or electrophysiology abnormality such as WPW.  EKG completed today demonstrates sinus bradycardia rate 53.  Interpretation suggested early repolarization pattern.  Repeat EKG normal sinus rhythm rate 62 with a single PAC noted.  Chest x-ray ordered, negative for acute findings.  Extensive amount of time spent reassuring and counseling patient regarding condition, symptoms, methods for stress reduction, and following up with primary care/psychiatrist as well.  Red flags, RTC and ER precautions discussed, with patient confirming their understanding of  need for immediate follow-up.  Discussed medication management options, risks and benefits, and alternatives to treatment plan agreed upon. Instructed to continue  medications without changes as ordered by primary care unless aforementioned above.  Patient expresses understanding and agrees to plan of care. All questions or concerns answered. For my MDM, I have personally reviewed previous notes, and test results as pertinent to today's visit.    Please note that this dictation was created using voice recognition software. I have made every reasonable attempt to correct obvious errors,  but there may be grammar errors, and possibly content that I did not discover before finalizing the note.   This note was electronically signed by CLIFTON Dai

## 2025-03-27 NOTE — LETTER
[facility] [place]  [address] [address]     March 27, 2025    Patient: Daniel Jr Ahumada   YOB: 1995   Date of Visit: 3/27/2025       To Whom It May Concern:    Daniel Ahumada was seen and treated in our department on 3/27/2025. It's my medical opinion that this patient would benefit from rest and recuperation for 3 days.   May return to work/school/ on 3/3/30/2025, or sooner if feeling better.      Sincerely,     YULIANA Dai.

## 2025-03-29 ENCOUNTER — HOSPITAL ENCOUNTER (EMERGENCY)
Facility: MEDICAL CENTER | Age: 30
End: 2025-03-30
Attending: EMERGENCY MEDICINE
Payer: COMMERCIAL

## 2025-03-29 DIAGNOSIS — F41.0 PANIC ATTACK: ICD-10-CM

## 2025-03-29 DIAGNOSIS — F41.9 ANXIETY DISORDER, UNSPECIFIED TYPE: ICD-10-CM

## 2025-03-29 PROCEDURE — 99282 EMERGENCY DEPT VISIT SF MDM: CPT

## 2025-03-30 VITALS
OXYGEN SATURATION: 98 % | RESPIRATION RATE: 18 BRPM | HEART RATE: 96 BPM | TEMPERATURE: 97.9 F | BODY MASS INDEX: 22.25 KG/M2 | WEIGHT: 138.45 LBS | DIASTOLIC BLOOD PRESSURE: 72 MMHG | HEIGHT: 66 IN | SYSTOLIC BLOOD PRESSURE: 105 MMHG

## 2025-03-30 NOTE — ED NOTES
D/C instructions, return precaution, and follow-up instructions reviewed with and given to pt. All questions answered. VSS. Ambulatory with steady gait. Left with all belongings.

## 2025-03-30 NOTE — ED TRIAGE NOTES
"Chief Complaint   Patient presents with    Anxiety     Pt reports started to have a panic attack PTA , unable to breathe, hyperventilating, felt numb all over his body. But now pt reports symptoms resolved. Pt have hx of anxiety and taking medication.      /87   Pulse 95   Temp 36.2 °C (97.1 °F) (Temporal)   Resp 18   Ht 1.676 m (5' 6\")   Wt 62.8 kg (138 lb 7.2 oz)   SpO2 99%   BMI 22.35 kg/m²     Ambulatory:  Yes  Alert and Oriented: x 4  Oxygen Treatment: No    Pt came in to triage for the above complaints.   Pt is speaking in full sentences, follows commands and responds appropriately to questions.   Respirations are even and unlabored.  Pt placed in lobby. Pt educated on triage process.   Pt encouraged to inform staff for any changes in condition or if needs help while waiting to be room in.'  "

## 2025-03-30 NOTE — ED PROVIDER NOTES
ER Provider Note    Scribed for Dr. Chel Beltran D.O. by Sammie Hung. 3/29/2025  11:22 PM    Primary Care Provider: Kobe No M.D.    CHIEF COMPLAINT  Chief Complaint   Patient presents with    Anxiety     Pt reports started to have a panic attack PTA , unable to breathe, hyperventilating, felt numb all over his body. But now pt reports symptoms resolved. Pt have hx of anxiety and taking medication.        EXTERNAL RECORDS REVIEWED  Outpatient Notes Patient seen at Urgent Care 3/27/2025 for dizziness and tingling. Noted to start taking Atarax and Bupropion one week prior to this date.     HPI/ROS  LIMITATION TO HISTORY   Select: : None    OUTSIDE HISTORIAN(S):  None    Daniel Jr Ahumada is a 29 y.o. male who presents to the ED for panic attack prior to arrival. Patient describes that recently he has been having many panic attacks. He reports being unable to breathe, hyperventilating, and feeling numb throughout his body. He reports having a history of anxiety and takes medication, Atarax and Bupropion as prescribed by his psychiatrist whom he will see in three weeks. He adds there has been no new significant changes in his personal life, no troubles at work, no housing issues, and in general denies any changes. 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 He denies drinking or drug use. He reports now in the room his symptoms have resolved. There are no known alleviating or exacerbating factors.      PAST MEDICAL HISTORY  Past Medical History:   Diagnosis Date    Anxiety     Depression      SURGICAL HISTORY  History reviewed. No pertinent surgical history.    FAMILY HISTORY  Family History   Problem Relation Age of Onset    Hypertension Mother     Diabetes Mother     Anxiety disorder Mother     Depression Sister     Anxiety disorder Sister     Other Sister         PTSD     SOCIAL HISTORY   reports that he has never smoked. He has never used smokeless tobacco. He reports that he  "does not currently use alcohol. He reports current drug use. Drugs: Marijuana and Inhaled.    CURRENT MEDICATIONS  Previous Medications    ALPRAZOLAM (XANAX) 0.25 MG TAB    Take 1 Tab by mouth 1 time daily as needed for Anxiety.    BUPROPION (WELLBUTRIN XL) 150 MG XL TABLET    TAKE 1 TABLET BY MOUTH EVERY DAY STARTDATE : 01-    FLUOXETINE (PROZAC) 20 MG CAP    TAKE ONE CAPSULE BY MOUTH EVERY DAY    HYDROXYZINE HCL (ATARAX) 10 MG TAB    1 TABLET AS NEEDED ORALLY TWICE A DAY AS NEEDED ANXIETY AND/OR INSOMNIA 30 DAYS    ONDANSETRON (ZOFRAN ODT) 4 MG TABLET DISPERSIBLE    Take 1 Tab by mouth every 6 hours as needed for Nausea.     ALLERGIES  Patient has no known allergies.    PHYSICAL EXAM  /87   Pulse 95   Temp 36.2 °C (97.1 °F) (Temporal)   Resp 18   Ht 1.676 m (5' 6\")   Wt 62.8 kg (138 lb 7.2 oz)   SpO2 99%   BMI 22.35 kg/m²   Constitutional: Patient is well developed, well nourished. Currently in no acute distress.   HENT: Normocephalic, atraumatic.  Moist oral mucosa.   Cardiovascular: Normal heart rate and Regular rhythm. No murmur,   Thorax & Lungs: Clear and equal breath sounds with good excursion. No respiratory distress  Abdomen: Bowel sounds normal in all four quadrants. Soft,nontender  Skin: Warm, Dry  Extremities: Peripheral pulses 4/4   Neurologic: Alert & oriented x 3, Normal motor function, Normal sensory function,   Psychiatric: Affect mildly anxious, very pleasant and cooperative    COURSE & MEDICAL DECISION MAKING     INITIAL ASSESSMENT AND PLAN  Care Narrative:       11:22 PM - Patient seen and evaluated at bedside. Patient reports feeling normal and the panic attack has since subsided. Discussed plan of care, including plan to follow up with his psychiatrist as soon as possible, and discussed plan to stop marijuana use. Patient agrees to plan of care. Noted the patient's exam and vitals at this time are reassuring. Discussed plan for discharge; I advised the patient to return to " the [facility] ED with any new or worsening symptoms. Patient was given the opportunity to ask any questions. I addressed all questions or concerns and the patient is stable for discharge at this time. Patient verbalizes understanding and agreement to this plan of care.                        DISPOSITION AND DISCUSSIONS  I have discussed management of the patient with the following physicians and MICHELLE's: None    Discussion of management with other [facility] or appropriate source(s): None     Barriers to care at this time, including but not limited to:  None .     Decision tools and prescription drugs considered including, but not limited to: None.    The patient will return for new or worsening symptoms and is stable at the time of discharge.    Follow-up with your psychiatrist as scheduled.  DISPOSITION:  Patient will be discharged home in stable condition.    FOLLOW UP:  Kobe No M.D.  [address] [place] [address] [address]  [address]  [facility] [address]  [phone]    Schedule an appointment as soon as possible for a visit in 2 days  If symptoms worsen      FINAL IMPRESSION   1. Panic attack    2. Anxiety disorder, unspecified type         I, Sammie Hung (Angie), am scribing for, and in the presence of, Chel Beltran D.O..    Electronically signed by: Sammie Hung (Angie), 3/29/2025    I, Chel Beltran D.O. personally performed the services described in this documentation, as scribed by Sammie Hung in my presence, and it is both accurate and complete.    The note accurately reflects work and decisions made by me.  Chel Beltran D.O.  3/30/2025  6:46 AM

## 2025-03-30 NOTE — DISCHARGE INSTRUCTIONS
Continue your current meds  Stop using Cannibus products and see if it helps reduce your anxiety  Use paper bag over your mouth and nose and do controlled breathing to avoid hyperventilating, tingling and cramping.  See your doctor next week for recheck

## 2025-03-30 NOTE — ED NOTES
"Pt here for anxiety. Pt was sitting in car in [place] waiting for significant other to return when he suddenly felt very anxious; was hyperventilating, unable to catch breath, and having numbness in extremities. Pt stated he started having these symptoms after starting Wellbutrin and Fluoxetine about 3 months ago. Was prescribed Hydroxyzine for anxiety, but \"I don't like taking them because they knock me out.\" Has appointment with psychiatrist mid April. Hoping to get off these medications. Currently more calm, states anxiety has subsided for the most part. Denies any numbness/ tingling at extremities. Respirations even and unlabored. Denies SI/HI. NAD.   "

## 2025-07-23 ENCOUNTER — HOSPITAL ENCOUNTER (EMERGENCY)
Facility: MEDICAL CENTER | Age: 30
End: 2025-07-23
Attending: EMERGENCY MEDICINE
Payer: OTHER MISCELLANEOUS

## 2025-07-23 ASSESSMENT — COGNITIVE AND FUNCTIONAL STATUS - GENERAL
MOBILITY SCORE: 24
SUGGESTED CMS G CODE MODIFIER MOBILITY: CH
DAILY ACTIVITIY SCORE: 24
SUGGESTED CMS G CODE MODIFIER DAILY ACTIVITY: CH
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
## 2025-07-23 NOTE — DISCHARGE INSTRUCTIONS
Follow-up with primary care as soon as possible for reevaluation, medication management and further workup such as MRI, physical therapy or specialty (orthopedic/spine surgery) evaluation if symptoms persist.    Naproxen twice daily as needed for discomfort.  Methocarbamol every 6 hours as needed for pain or spasm.  Prednisone daily for 5 days.    Light activity as tolerated.    Return to the emergency department for persistent or worsening pain, extremity weakness or paresthesias, swelling, color change or other new concerns.

## 2025-07-24 NOTE — ED NOTES
Patient discharged per order. Oral and written discharge instructions reviewed. Medications sent to home pharmacy. New medications reviewed. All belongings accounted for and taken with patient. Questions answered, and patient agrees with discharge plan. Encouraged to follow up with PCP.    Patient leaving ER in stable condition, AAOx4, ambulatory, respirations even and unlabored.